# Patient Record
Sex: MALE | Race: WHITE | NOT HISPANIC OR LATINO | URBAN - METROPOLITAN AREA
[De-identification: names, ages, dates, MRNs, and addresses within clinical notes are randomized per-mention and may not be internally consistent; named-entity substitution may affect disease eponyms.]

---

## 2023-07-18 DIAGNOSIS — R05.3 CHRONIC COUGH: ICD-10-CM

## 2023-07-25 NOTE — PROGRESS NOTES
Pulmonary Clinic Consultation          Assessment/Plan:     78 year old male with a history of asthma, HTN, presenting for evaluation of chronic cough.    Moderate persistent asthma  Chronic cough  Environmental allergies  Allergic rhinitis  GERD  Diagnosed as a child, history of allergies to pollen, mold, ragweed, grasses.  Followed by Allergy as recently as 2020, was previously on injections.  Symptoms include chest tightness with exercise, now with worsening cough, sputum production, and rhinorrhea in the last couple months.  His current regimen includes Flovent BID, Albuterol PRN, Zyrtec, Flonase.  Pulmonary function testing today shows normal spirometry, lung volumes, and increased diffusion capacity.  Most likely increase in cough is related to allergies exacerbating his asthma, as well as possible untreated GERD.  Plan:  - increase regimen from ICS to ICS/LABA:  Stop Flovent and start Symbicort (budesonide/formoterol) two puffs twice daily, rinse/gargle after use.  He will let us know if there are coverage issues at pharmacy and if he needs a different script.  - continue Albuterol PRN  - continue Zyrtec   - stop Flonase, start Atrovent nasal spray twice daily  - continue saline rinses daily  - consider addition of Singulair 10mg at next visit if no improvement  - discussed lifestyle changes in regards to GERD:  not laying down after dinner to read, elevating HOB, avoiding triggers (red wine).  Consider PPI at next visit if symptoms not improved.  - he is UTD with COVID vaccines and booster, annual influenza vaccine, and pneumococcal vaccines.  - Action plan: prednisone 20mg x7 days    Follow-up  - one month    Fifi Ospina, CNP  Pulmonary Medicine  [facility]  [phone]       CC:     Chronic cough evaluation     HPI:     78 year old male with a history of asthma, HTN, presenting for evaluation of chronic cough.    April of 2020 had URI, possibly COVID.  Persistent  cough x1 month after.  Very active, played soccer until age 70.  Used to run, now biking the last couple years.  Feels some chest tightness while exercising.  Denies shortness of breath.  Cough is worse recently, last couple months.  Intermittently productive.  Sometimes hard to expectorate.  Denies fevers, muscle aches, night sweats.  On Flovent two puffs BID x couple months, before that was on different ICS.  Has had issues with allergies since moved back to [place] in .  Did allergy testing then, [facility] - mold, ragweed, pollen, grasses.  Was on injections for awhile.   Some increase in sinus symptoms lately - does Neti pot rinses.  Was on Allegra for some time, just switched to Zyrtec, possibly some benefit.  Flonase once-twice/day.  Does have intermittent acid reflux, with red wine. Has seen ENT in past, did scope.  Sometimes does eat within one hour of bedtime.  Smoking hx: quit in .    Medical records reviewed for this visit include PCP note.     ROS:     A 12-system review was obtained and was negative with the exception of the symptoms endorsed in the HPI.       Medical history:       PMH:  Past Medical History:   Diagnosis Date    Asthma     No Comments Provided    Essential hypertension     No Comments Provided    Impotence of organic origin     No Comments Provided    Malignant neoplasm of prostate (H)     2005,PSA 5.67    Peyronie's disease     No Comments Provided    Rosacea     No Comments Provided       PSH:  Past Surgical History:   Procedure Laterality Date    OTHER SURGICAL HISTORY      2005,,RADICAL PROSTATECTOMY,Pathologic stage T2c    VASECTOMY             Allergies:  No Known Allergies    Family Hx:  Family History   Problem Relation Age of Onset    Heart Disease Father         Heart Disease, of AAA    Diabetes Mother         Diabetes    Hypertension Mother         Hypertension    Thyroid Disease Mother         Thyroid Disease       Social  Hx:  Social History     Socioeconomic History    Marital status:      Spouse name: Not on file    Number of children: Not on file    Years of education: Not on file    Highest education level: Not on file   Occupational History    Not on file   Tobacco Use    Smoking status: Former     Packs/day: 1.00     Years: 13.00     Pack years: 13.00     Types: Cigarettes     Quit date: 1974     Years since quittin.6    Smokeless tobacco: Never   Vaping Use    Vaping Use: Never used   Substance and Sexual Activity    Alcohol use: Yes     Alcohol/week: 1.7 standard drinks of alcohol     Comment: Alcoholic Drinks/day: Social    Drug use: Unknown     Types: Other     Comment: Drug use: No    Sexual activity: Yes     Partners: Female   Other Topics Concern    Not on file   Social History Narrative    Preload 2012     Social Determinants of Health     Financial Resource Strain: Not on file   Food Insecurity: Not on file   Transportation Needs: Not on file   Physical Activity: Not on file   Stress: Not on file   Social Connections: Not on file   Intimate Partner Violence: Not on file   Housing Stability: Not on file       Current Meds:  Current Outpatient Medications   Medication Sig Dispense Refill    albuterol (PROAIR HFA/PROVENTIL HFA/VENTOLIN HFA) 108 (90 Base) MCG/ACT inhaler Inhale 2 puffs into the lungs every 6 hours as needed      budesonide-formoterol (SYMBICORT) 160-4.5 MCG/ACT Inhaler Inhale 2 puffs into the lungs 2 times daily 6 g 4    cetirizine (ZYRTEC) 10 MG tablet Take 10 mg by mouth daily      fluticasone (FLONASE) 50 MCG/ACT nasal spray Spray 1 spray into both nostrils daily      fluticasone (FLOVENT HFA) 110 MCG/ACT inhaler Inhale 2 puffs into the lungs 2 times daily      hydroCHLOROthiazide (HYDRODIURIL) 25 MG tablet Take 25 mg by mouth daily      ipratropium (ATROVENT) 0.06 % nasal spray Spray 2 sprays into both nostrils 2 times daily 15 mL 11    losartan (COZAAR) 100 MG tablet Take 100 mg  "by mouth daily      multivitamin w/minerals (THERA-VIT-M) tablet Take 1 tablet by mouth daily      NONFORMULARY Tumeric 1500mg per day  CO Q 10 100mg per day            Physical Exam:     /74 (BP Location: Left arm, Patient Position: Chair, Cuff Size: Adult Regular)   Pulse 62   Ht 1.791 m (5' 10.5\")   Wt 85.7 kg (189 lb)   SpO2 98%   BMI 26.74 kg/m    Gen: adult male , appears in NAD  HEENT: clear conjunctivae, moist mucous membranes  CV: RRR, no M/G/R  Resp: CTAB, no focal crackles or wheezes.  Respirations even and unlabored.  On RA.  Intermittent cough during exam.  Skin: no apparent rashes on visible skin  Ext: no cyanosis, clubbing or edema  Neuro: alert and answering questions appropriately       Data:     Labs:  reviewed    Imaging studies:  I have personally reviewed all pertinent imaging studies and PFT results unless otherwise noted.    EXAM: XR CHEST 2 VIEWS PA AND LATERAL   LOCATION: [facility]   DATE/TIME: 7/2/2020   INDICATION: Pleuritic Chest Pain   COMPARISON: 04/04/2018   IMPRESSION: Heart size and pulmonary vascularity normal. The lungs are clear.   Multiple old right rib fractures.       Pulmonary Function Testing    7/28/23:      2012:        "

## 2023-07-28 ENCOUNTER — ALLIED HEALTH/NURSE VISIT (OUTPATIENT)
Dept: PULMONOLOGY | Facility: CLINIC | Age: 78
End: 2023-07-28
Payer: COMMERCIAL

## 2023-07-28 ENCOUNTER — OFFICE VISIT (OUTPATIENT)
Dept: PULMONOLOGY | Facility: CLINIC | Age: 78
End: 2023-07-28
Payer: COMMERCIAL

## 2023-07-28 VITALS
HEIGHT: 71 IN | SYSTOLIC BLOOD PRESSURE: 132 MMHG | BODY MASS INDEX: 26.46 KG/M2 | WEIGHT: 189 LBS | DIASTOLIC BLOOD PRESSURE: 74 MMHG | OXYGEN SATURATION: 98 % | HEART RATE: 62 BPM

## 2023-07-28 DIAGNOSIS — R05.3 CHRONIC COUGH: ICD-10-CM

## 2023-07-28 DIAGNOSIS — J30.1 NON-SEASONAL ALLERGIC RHINITIS DUE TO POLLEN: ICD-10-CM

## 2023-07-28 DIAGNOSIS — J34.89 RHINORRHEA: ICD-10-CM

## 2023-07-28 DIAGNOSIS — J45.40 MODERATE PERSISTENT ASTHMA WITHOUT COMPLICATION: Primary | ICD-10-CM

## 2023-07-28 LAB
DLCOCOR-%PRED-PRE: 130 %
DLCOCOR-PRE: 32.96 ML/MIN/MMHG
DLCOUNC-%PRED-PRE: 127 %
DLCOUNC-PRE: 32.2 ML/MIN/MMHG
DLCOUNC-PRED: 25.31 ML/MIN/MMHG
ERV-%PRED-PRE: 78 %
ERV-PRE: 0.99 L
ERV-PRED: 1.27 L
EXPTIME-PRE: 6.38 SEC
FEF2575-%PRED-POST: 253 %
FEF2575-%PRED-PRE: 228 %
FEF2575-POST: 5.33 L/SEC
FEF2575-PRE: 4.78 L/SEC
FEF2575-PRED: 2.1 L/SEC
FEFMAX-%PRED-PRE: 128 %
FEFMAX-PRE: 10.07 L/SEC
FEFMAX-PRED: 7.83 L/SEC
FEV1-%PRED-PRE: 121 %
FEV1-PRE: 3.46 L
FEV1FEV6-PRE: 87 %
FEV1FEV6-PRED: 77 %
FEV1FVC-PRE: 87 %
FEV1FVC-PRED: 76 %
FEV1SVC-PRE: 90 %
FEV1SVC-PRED: 66 %
FIFMAX-PRE: 6.13 L/SEC
FRCPLETH-%PRED-PRE: 81 %
FRCPLETH-PRE: 3.3 L
FRCPLETH-PRED: 4.05 L
FVC-%PRED-PRE: 105 %
FVC-PRE: 3.96 L
FVC-PRED: 3.77 L
HGB BLD-MCNC: 13.8 G/DL
IC-%PRED-PRE: 92 %
IC-PRE: 2.85 L
IC-PRED: 3.08 L
RVPLETH-%PRED-PRE: 84 %
RVPLETH-PRE: 2.31 L
RVPLETH-PRED: 2.73 L
TLCPLETH-%PRED-PRE: 85 %
TLCPLETH-PRE: 6.15 L
TLCPLETH-PRED: 7.22 L
VA-%PRED-PRE: 95 %
VA-PRE: 6.19 L
VC-%PRED-PRE: 88 %
VC-PRE: 3.84 L
VC-PRED: 4.32 L

## 2023-07-28 PROCEDURE — 85018 HEMOGLOBIN: CPT | Performed by: INTERNAL MEDICINE

## 2023-07-28 PROCEDURE — 99204 OFFICE O/P NEW MOD 45 MIN: CPT | Performed by: NURSE PRACTITIONER

## 2023-07-28 PROCEDURE — 94726 PLETHYSMOGRAPHY LUNG VOLUMES: CPT | Performed by: INTERNAL MEDICINE

## 2023-07-28 PROCEDURE — 94060 EVALUATION OF WHEEZING: CPT | Performed by: INTERNAL MEDICINE

## 2023-07-28 PROCEDURE — 94729 DIFFUSING CAPACITY: CPT | Performed by: INTERNAL MEDICINE

## 2023-07-28 RX ORDER — LOSARTAN POTASSIUM 100 MG/1
100 TABLET ORAL DAILY
COMMUNITY

## 2023-07-28 RX ORDER — FLUTICASONE PROPIONATE 50 MCG
1 SPRAY, SUSPENSION (ML) NASAL DAILY
COMMUNITY
End: 2023-09-12 | Stop reason: ALTCHOICE

## 2023-07-28 RX ORDER — ALBUTEROL SULFATE 90 UG/1
2 AEROSOL, METERED RESPIRATORY (INHALATION) EVERY 6 HOURS PRN
COMMUNITY

## 2023-07-28 RX ORDER — BUDESONIDE AND FORMOTEROL FUMARATE DIHYDRATE 160; 4.5 UG/1; UG/1
2 AEROSOL RESPIRATORY (INHALATION) 2 TIMES DAILY
Qty: 6 G | Refills: 4 | Status: SHIPPED | OUTPATIENT
Start: 2023-07-28 | End: 2023-10-27

## 2023-07-28 RX ORDER — MULTIPLE VITAMINS W/ MINERALS TAB 9MG-400MCG
1 TAB ORAL DAILY
COMMUNITY

## 2023-07-28 RX ORDER — FLUTICASONE PROPIONATE 110 UG/1
2 AEROSOL, METERED RESPIRATORY (INHALATION) 2 TIMES DAILY
COMMUNITY
End: 2024-06-10 | Stop reason: ALTCHOICE

## 2023-07-28 RX ORDER — IPRATROPIUM BROMIDE 42 UG/1
2 SPRAY, METERED NASAL 2 TIMES DAILY
Qty: 15 ML | Refills: 11 | Status: SHIPPED | OUTPATIENT
Start: 2023-07-28 | End: 2024-06-10 | Stop reason: ALTCHOICE

## 2023-07-28 RX ORDER — CETIRIZINE HYDROCHLORIDE 10 MG/1
10 TABLET ORAL DAILY
COMMUNITY

## 2023-07-28 NOTE — PATIENT INSTRUCTIONS
It was a pleasure to see you in clinic today.   Here is what we discussed:    Stop Flovent, start Symbicort two puffs twice daily, rinse/gargle.  Continue Albuterol every 4-6 hours for shortness of breath or wheezing.  Continue Zyrtec 10mg daily.  Start Atrovent nasal spray twice daily.  Stay upright at least one hour after dinner.   Call us with any change or worsening of your breathing.  Follow-up in one month.    Fifi Ospina, CNP  Pulmonary Medicine  [facility]  [phone]

## 2023-09-12 ENCOUNTER — OFFICE VISIT (OUTPATIENT)
Dept: PULMONOLOGY | Facility: CLINIC | Age: 78
End: 2023-09-12
Attending: NURSE PRACTITIONER
Payer: COMMERCIAL

## 2023-09-12 VITALS
HEART RATE: 66 BPM | BODY MASS INDEX: 27.39 KG/M2 | SYSTOLIC BLOOD PRESSURE: 128 MMHG | WEIGHT: 193.6 LBS | OXYGEN SATURATION: 98 % | DIASTOLIC BLOOD PRESSURE: 70 MMHG

## 2023-09-12 DIAGNOSIS — J34.89 RHINORRHEA: ICD-10-CM

## 2023-09-12 DIAGNOSIS — R05.3 CHRONIC COUGH: Primary | ICD-10-CM

## 2023-09-12 DIAGNOSIS — K21.9 GASTROESOPHAGEAL REFLUX DISEASE WITHOUT ESOPHAGITIS: ICD-10-CM

## 2023-09-12 DIAGNOSIS — J45.40 MODERATE PERSISTENT ASTHMA WITHOUT COMPLICATION: ICD-10-CM

## 2023-09-12 PROCEDURE — 99213 OFFICE O/P EST LOW 20 MIN: CPT | Performed by: NURSE PRACTITIONER

## 2023-09-12 ASSESSMENT — ASTHMA QUESTIONNAIRES
QUESTION_4 LAST FOUR WEEKS HOW OFTEN HAVE YOU USED YOUR RESCUE INHALER OR NEBULIZER MEDICATION (SUCH AS ALBUTEROL): NOT AT ALL
ACT_TOTALSCORE: 25
QUESTION_5 LAST FOUR WEEKS HOW WOULD YOU RATE YOUR ASTHMA CONTROL: COMPLETELY CONTROLLED
QUESTION_3 LAST FOUR WEEKS HOW OFTEN DID YOUR ASTHMA SYMPTOMS (WHEEZING, COUGHING, SHORTNESS OF BREATH, CHEST TIGHTNESS OR PAIN) WAKE YOU UP AT NIGHT OR EARLIER THAN USUAL IN THE MORNING: NOT AT ALL
QUESTION_2 LAST FOUR WEEKS HOW OFTEN HAVE YOU HAD SHORTNESS OF BREATH: NOT AT ALL
QUESTION_1 LAST FOUR WEEKS HOW MUCH OF THE TIME DID YOUR ASTHMA KEEP YOU FROM GETTING AS MUCH DONE AT WORK, SCHOOL OR AT HOME: NONE OF THE TIME
ACT_TOTALSCORE: 25

## 2023-09-12 NOTE — PROGRESS NOTES
Pulmonary Clinic Follow-Up          Assessment/Plan:     78 year old male with a history of asthma, HTN, presenting for follow-up of chronic cough.    Moderate persistent asthma  Chronic cough  Environmental allergies  Allergic rhinitis  GERD  Diagnosed with asthma as a child, history of allergies to pollen, mold, ragweed, grasses.  Followed by Allergy as recently as 2020, was previously on injections.  Symptoms include chest tightness with exercise, now with worsening cough, sputum production, and rhinorrhea in the last couple months.  Pulmonary function testing shows normal spirometry, lung volumes, and increased diffusion capacity, which can be seen in asthma.  Last visit it was suspected that his increase in cough was related to allergies exacerbating his asthma, as well as possible untreated GERD.  His regimen was increased from Flovent to Symbicort.  He found benefit from this, and also with addressing his acid reflux with lifestyle changes.  Plan:  - continue Symbicort (budesonide/formoterol) 160/4.5 two puffs twice daily, rinse/gargle after use.   - continue Albuterol PRN  - continue Zyrtec 10mg  - continue Atrovent nasal spray, can adjust this based on how drying it is.  - continue saline rinses daily, prior to nasal spray  - discussed lifestyle changes in regards to GERD:  not laying down after dinner to read, elevating HOB, avoiding triggers (red wine, tomatoes).  He will trial something OTC as well, he is hesitant to start PPI at this time.  - he is UTD with COVID vaccines and booster, annual influenza vaccine, and pneumococcal vaccines.  - Action plan: prednisone 20mg x7 days    Follow-up  - nine months    Fifi Ospina, CNP  Pulmonary Medicine  [facility]  [phone]       CC:     Chronic cough follow-up     HPI:     78 year old male with a history of asthma, HTN, presenting for follow-up of chronic cough.    Since last visit, he did find benefit from Symbicort,  as well as Atrovent nasal spray.  He does notice that tomatoes has become one of his triggers for acid reflux, and he noticed increased cough after a meal with tomato sauce.   He is still able to bike, has not trialed running again yet.    Previous HPI:  April of 2020 had URI, possibly COVID.  Persistent cough x1 month after.  Very active, played soccer until age 70.  Used to run, now biking the last couple years.  Feels some chest tightness while exercising.  Denies shortness of breath.  Cough is worse recently, last couple months.  Intermittently productive.  Sometimes hard to expectorate.  Denies fevers, muscle aches, night sweats.  On Flovent two puffs BID x couple months, before that was on different ICS.  Has had issues with allergies since moved back to [place] in 1988.  Did allergy testing then, [facility] - mold, ragweed, pollen, grasses.  Was on injections for awhile.   Some increase in sinus symptoms lately - does Neti pot rinses.  Was on Allegra for some time, just switched to Zyrtec, possibly some benefit.  Flonase once-twice/day.  Does have intermittent acid reflux, with red wine. Has seen ENT in past, did scope.  Sometimes does eat within one hour of bedtime.  Smoking hx: quit in 1974.       ROS:     A 6-system review was obtained and was negative with the exception of the symptoms endorsed in the HPI.       Medical history:       PMH:  Past Medical History:   Diagnosis Date    Asthma     No Comments Provided    Essential hypertension     No Comments Provided    Impotence of organic origin     No Comments Provided    Malignant neoplasm of prostate (H)     1/5/2005,PSA 5.67    Peyronie's disease     No Comments Provided    Rosacea     No Comments Provided       PSH:  Past Surgical History:   Procedure Laterality Date    OTHER SURGICAL HISTORY      2/23/2005,,RADICAL PROSTATECTOMY,Pathologic stage T2c    VASECTOMY      1992       Allergies:  Allergies   Allergen Reactions    Mold         Family Hx:  Family History   Problem Relation Age of Onset    Heart Disease Father         Heart Disease, of AAA    Diabetes Mother         Diabetes    Hypertension Mother         Hypertension    Thyroid Disease Mother         Thyroid Disease       Social Hx:  Social History     Socioeconomic History    Marital status:      Spouse name: Not on file    Number of children: Not on file    Years of education: Not on file    Highest education level: Not on file   Occupational History    Not on file   Tobacco Use    Smoking status: Former     Packs/day: 1.00     Years: 13.00     Pack years: 13.00     Types: Cigarettes     Quit date: 1974     Years since quittin.7    Smokeless tobacco: Never   Vaping Use    Vaping Use: Never used   Substance and Sexual Activity    Alcohol use: Yes     Alcohol/week: 1.7 standard drinks of alcohol     Comment: Alcoholic Drinks/day: Social    Drug use: Unknown     Types: Other     Comment: Drug use: No    Sexual activity: Yes     Partners: Female   Other Topics Concern    Not on file   Social History Narrative    Preload 2012     Social Determinants of Health     Financial Resource Strain: Not on file   Food Insecurity: Not on file   Transportation Needs: Not on file   Physical Activity: Not on file   Stress: Not on file   Social Connections: Not on file   Intimate Partner Violence: Not on file   Housing Stability: Not on file       Current Meds:  Current Outpatient Medications   Medication Sig Dispense Refill    albuterol (PROAIR HFA/PROVENTIL HFA/VENTOLIN HFA) 108 (90 Base) MCG/ACT inhaler Inhale 2 puffs into the lungs every 6 hours as needed      budesonide-formoterol (SYMBICORT) 160-4.5 MCG/ACT Inhaler Inhale 2 puffs into the lungs 2 times daily 6 g 4    cetirizine (ZYRTEC) 10 MG tablet Take 10 mg by mouth daily      fluticasone (FLOVENT HFA) 110 MCG/ACT inhaler Inhale 2 puffs into the lungs 2 times daily      hydroCHLOROthiazide (HYDRODIURIL) 25 MG tablet  Take 25 mg by mouth daily      losartan (COZAAR) 100 MG tablet Take 100 mg by mouth daily      multivitamin w/minerals (THERA-VIT-M) tablet Take 1 tablet by mouth daily      NONFORMULARY Tumeric 1500mg per day  CO Q 10 100mg per day      fluticasone (FLONASE) 50 MCG/ACT nasal spray Spray 1 spray into both nostrils daily      ipratropium (ATROVENT) 0.06 % nasal spray Spray 2 sprays into both nostrils 2 times daily 15 mL 11          Physical Exam:     /70 (BP Location: Left arm, Patient Position: Sitting, Cuff Size: Adult Regular)   Pulse 66   Wt 87.8 kg (193 lb 9.6 oz)   SpO2 98%   BMI 27.39 kg/m    Gen: adult male , appears in NAD  HEENT: clear conjunctivae, moist mucous membranes  CV: RRR, no M/G/R  Resp: CTAB, no focal crackles or wheezes.  Respirations even and unlabored.  On RA.  Intermittent cough during exam.  Skin: no apparent rashes on visible skin  Ext: no cyanosis, clubbing or edema  Neuro: alert and answering questions appropriately       Data:     Labs:  reviewed    Imaging studies:  I have personally reviewed all pertinent imaging studies and PFT results unless otherwise noted.    EXAM: XR CHEST 2 VIEWS PA AND LATERAL   LOCATION: [facility]   DATE/TIME: 7/2/2020   INDICATION: Pleuritic Chest Pain   COMPARISON: 04/04/2018   IMPRESSION: Heart size and pulmonary vascularity normal. The lungs are clear.   Multiple old right rib fractures.       Pulmonary Function Testing    7/28/23:      2012:

## 2023-09-12 NOTE — PATIENT INSTRUCTIONS
It was a pleasure to see you in clinic today.   Here is what we discussed:    Continue Symbicort two puffs twice daily, rinse/gargle after use.  Continue Albuterol every 4-6 hours as needed for shortness of breath or wheezing.  Trial an over the counter medication for acid reflux, and watch out for potential food triggers.    Continue Zyrtec 10mg daily  Continue Atrovent nasal spray and saline rinses  Call us with any change or worsening of your breathing.  Follow-up in 6 months.    Fifi Ospina, CNP  Pulmonary Medicine  [facility]  [phone]

## 2023-10-27 ENCOUNTER — TELEPHONE (OUTPATIENT)
Dept: PULMONOLOGY | Facility: CLINIC | Age: 78
End: 2023-10-27
Payer: COMMERCIAL

## 2023-10-27 DIAGNOSIS — J45.40 MODERATE PERSISTENT ASTHMA WITHOUT COMPLICATION: ICD-10-CM

## 2023-10-27 RX ORDER — BUDESONIDE AND FORMOTEROL FUMARATE DIHYDRATE 160; 4.5 UG/1; UG/1
2 AEROSOL RESPIRATORY (INHALATION) 2 TIMES DAILY
Qty: 10.2 G | Refills: 6 | Status: SHIPPED | OUTPATIENT
Start: 2023-10-27 | End: 2024-05-29

## 2023-10-27 NOTE — TELEPHONE ENCOUNTER
[facility] Call Center    Phone Message    May a detailed message be left on voicemail: yes     Reason for Call: Medication Refill Request    Has the patient contacted the pharmacy for the refill? Yes   Name of medication being requested: budesonide-formoterol (SYMBICORT) 160-4.5 MCG/ACT Inhaler   Provider who prescribed the medication: Fifi Gonzalez  Pharmacy: [facility] in [place], [place]   Date medication is needed: asap       Action Taken: Other: pulm    Travel Screening: Not Applicable

## 2024-05-29 DIAGNOSIS — J45.40 MODERATE PERSISTENT ASTHMA WITHOUT COMPLICATION: ICD-10-CM

## 2024-05-29 RX ORDER — BUDESONIDE AND FORMOTEROL FUMARATE DIHYDRATE 160; 4.5 UG/1; UG/1
2 AEROSOL RESPIRATORY (INHALATION) 2 TIMES DAILY
Qty: 10.2 G | Refills: 1 | Status: SHIPPED | OUTPATIENT
Start: 2024-05-29 | End: 2024-05-31

## 2024-05-31 DIAGNOSIS — J45.40 MODERATE PERSISTENT ASTHMA WITHOUT COMPLICATION: ICD-10-CM

## 2024-05-31 RX ORDER — BUDESONIDE AND FORMOTEROL FUMARATE DIHYDRATE 160; 4.5 UG/1; UG/1
2 AEROSOL RESPIRATORY (INHALATION) 2 TIMES DAILY
Qty: 30.6 G | Refills: 0 | Status: SHIPPED | OUTPATIENT
Start: 2024-05-31 | End: 2024-06-10

## 2024-06-10 ENCOUNTER — OFFICE VISIT (OUTPATIENT)
Dept: PULMONOLOGY | Facility: CLINIC | Age: 79
End: 2024-06-10
Attending: NURSE PRACTITIONER
Payer: COMMERCIAL

## 2024-06-10 VITALS
HEART RATE: 60 BPM | DIASTOLIC BLOOD PRESSURE: 76 MMHG | SYSTOLIC BLOOD PRESSURE: 138 MMHG | WEIGHT: 194 LBS | OXYGEN SATURATION: 100 % | BODY MASS INDEX: 27.44 KG/M2

## 2024-06-10 DIAGNOSIS — J30.1 NON-SEASONAL ALLERGIC RHINITIS DUE TO POLLEN: ICD-10-CM

## 2024-06-10 DIAGNOSIS — J45.40 MODERATE PERSISTENT ASTHMA WITHOUT COMPLICATION: Primary | ICD-10-CM

## 2024-06-10 DIAGNOSIS — K21.9 GASTROESOPHAGEAL REFLUX DISEASE WITHOUT ESOPHAGITIS: ICD-10-CM

## 2024-06-10 PROCEDURE — 99214 OFFICE O/P EST MOD 30 MIN: CPT | Performed by: NURSE PRACTITIONER

## 2024-06-10 RX ORDER — AMLODIPINE BESYLATE 5 MG/1
5 TABLET ORAL DAILY
COMMUNITY

## 2024-06-10 RX ORDER — BUDESONIDE AND FORMOTEROL FUMARATE DIHYDRATE 160; 4.5 UG/1; UG/1
2 AEROSOL RESPIRATORY (INHALATION) 2 TIMES DAILY
Qty: 30.6 G | Refills: 4 | Status: SHIPPED | OUTPATIENT
Start: 2024-06-10

## 2024-06-10 ASSESSMENT — ASTHMA QUESTIONNAIRES
ACT_TOTALSCORE: 25
QUESTION_5 LAST FOUR WEEKS HOW WOULD YOU RATE YOUR ASTHMA CONTROL: COMPLETELY CONTROLLED
QUESTION_3 LAST FOUR WEEKS HOW OFTEN DID YOUR ASTHMA SYMPTOMS (WHEEZING, COUGHING, SHORTNESS OF BREATH, CHEST TIGHTNESS OR PAIN) WAKE YOU UP AT NIGHT OR EARLIER THAN USUAL IN THE MORNING: NOT AT ALL
QUESTION_4 LAST FOUR WEEKS HOW OFTEN HAVE YOU USED YOUR RESCUE INHALER OR NEBULIZER MEDICATION (SUCH AS ALBUTEROL): NOT AT ALL
QUESTION_2 LAST FOUR WEEKS HOW OFTEN HAVE YOU HAD SHORTNESS OF BREATH: NOT AT ALL
ACT_TOTALSCORE: 25
QUESTION_1 LAST FOUR WEEKS HOW MUCH OF THE TIME DID YOUR ASTHMA KEEP YOU FROM GETTING AS MUCH DONE AT WORK, SCHOOL OR AT HOME: NONE OF THE TIME

## 2024-06-10 NOTE — PROGRESS NOTES
Pulmonary Clinic Follow-Up          Assessment/Plan:     78 year old male with a history of asthma, HTN, presenting for 9 month follow-up of chronic cough.    Moderate persistent asthma  Chronic cough  Environmental allergies  Allergic rhinitis  GERD  Diagnosed with asthma as a child, history of allergies to pollen, mold, ragweed, grasses.  Followed by Allergy as recently as 2020, was previously on injections.  Symptoms include chest tightness with exercise, cough, sputum production, and rhinorrhea.  Pulmonary function testing shows normal spirometry, lung volumes, and increased diffusion capacity, which can be seen in asthma.  Last visit it was suspected that his increase in cough was related to allergies exacerbating his asthma, as well as possible untreated GERD.  His asthma has been controlled with Symbicort.  ACT score 25 today.  Continues to have dry cough, did see GI and was recommended to start omeprazole 40mg daily, but he has only been taking this in two week periods with breaks in between.    Plan:  - continue Symbicort (budesonide/formoterol) 160/4.5 two puffs twice daily, rinse/gargle after use.   - continue Albuterol PRN  - continue Zyrtec 10mg, switches between Allegra.  - continue Azelastine + Flonase nasal sprays, per ENT.  - continue saline rinses daily, prior to nasal spray  - restart omeprazole as directed by GI, and follow-up as indicated.  - continue lifestyle changes in regards to GERD:  not laying down after dinner to read, elevating HOB, avoiding triggers (red wine, tomatoes).    - he is UTD with COVID vaccines and booster, annual influenza vaccine, and pneumococcal vaccines.  - Action plan: prednisone 20mg x7 days    Follow-up  - one year    Fifi Ospina CNP  Pulmonary Medicine  [facility]  [phone]         CC:     Chronic cough follow-up     HPI:     78 year old male with a history of asthma, HTN, presenting for 9 month follow-up of chronic  cough.    Since last visit (2023), breathing has been stable and controlled.  No URIs or exacerbations since last visit.   Now using azelastine nasal spray + flonase.  Working better than atrovent. Through ENT.   Was on omeprazole, taking on empty stomach, has to set an alarm.   Doesn't have typical acid reflux symptoms. Taking omeprazole only for 2 weeks at a time, then months break.   Saw GI few months ago at [facility].  Recommended to take 40mg of omeprazole, only took for 2 weeks.   Saw allergist for testing in past.           2023     9:00 AM 6/10/2024     9:44 AM   ACT Total Scores   ACT TOTAL SCORE (Goal Greater than or Equal to 20) 25 25   In the past 12 months, how many times did you visit the emergency room for your asthma without being admitted to the hospital? 0 0   In the past 12 months, how many times were you hospitalized overnight because of your asthma? 0 0          ROS:     A 6-system review was obtained and was negative with the exception of the symptoms endorsed in the HPI.       Medical history:       PMH:  Past Medical History:   Diagnosis Date    Asthma     No Comments Provided    Essential hypertension     No Comments Provided    Impotence of organic origin     No Comments Provided    Malignant neoplasm of prostate (H)     2005,PSA 5.67    Peyronie's disease     No Comments Provided    Rosacea     No Comments Provided       PSH:  Past Surgical History:   Procedure Laterality Date    OTHER SURGICAL HISTORY      2005,,RADICAL PROSTATECTOMY,Pathologic stage T2c    VASECTOMY             Allergies:  Allergies   Allergen Reactions    Mold        Family Hx:  Family History   Problem Relation Age of Onset    Heart Disease Father         Heart Disease, of AAA    Diabetes Mother         Diabetes    Hypertension Mother         Hypertension    Thyroid Disease Mother         Thyroid Disease       Social Hx:  Social History     Socioeconomic History    Marital status:       Spouse name: Not on file    Number of children: Not on file    Years of education: Not on file    Highest education level: Not on file   Occupational History    Not on file   Tobacco Use    Smoking status: Former     Current packs/day: 0.00     Average packs/day: 1 pack/day for 13.0 years (13.0 ttl pk-yrs)     Types: Cigarettes     Start date: 1961     Quit date: 1974     Years since quittin.4    Smokeless tobacco: Never   Vaping Use    Vaping status: Never Used   Substance and Sexual Activity    Alcohol use: Yes     Alcohol/week: 1.7 standard drinks of alcohol     Comment: Alcoholic Drinks/day: Social    Drug use: Unknown     Types: Other     Comment: Drug use: No    Sexual activity: Yes     Partners: Female   Other Topics Concern    Not on file   Social History Narrative    Preload 2012     Social Determinants of Health     Financial Resource Strain: Low Risk  (2023)    Received from [facility] [employer] [facility] [employer] [facility], [facility]    Financial Resource Strain     Difficulty of Paying Living Expenses: 3     Difficulty of Paying Living Expenses: Not on file   Food Insecurity: No Food Insecurity (2023)    Received from [facility] [employer] [facility], [facility] [place] [employer] [facility]    Food Insecurity     Worried About Running Out of Food in the Last Year: 1   Transportation Needs: No Transportation Needs (2023)    [facility], [facility]    Transportation Needs     Lack of Transportation (Medical): 1   Physical Activity: Not on file   Stress: Not on file   Social Connections: Unknown (2024)    Received from [facility] [employer] [facility]    Social Connections     Frequency of Communication with Friends and Family: Not on file   Interpersonal Safety: Not on file   Housing Stability: Low Risk  (2023)    Received  from [facility], [facility]    Housing Stability     Unable to Pay for Housing in the Last Year: 1       Current Meds:  Current Outpatient Medications   Medication Sig Dispense Refill    albuterol (PROAIR HFA/PROVENTIL HFA/VENTOLIN HFA) 108 (90 Base) MCG/ACT inhaler Inhale 2 puffs into the lungs every 6 hours as needed      amLODIPine (NORVASC) 5 MG tablet Take 5 mg by mouth daily      budesonide-formoterol (SYMBICORT) 160-4.5 MCG/ACT Inhaler Inhale 2 puffs into the lungs 2 times daily 30.6 g 0    cetirizine (ZYRTEC) 10 MG tablet Take 10 mg by mouth daily      hydroCHLOROthiazide (HYDRODIURIL) 25 MG tablet Take 25 mg by mouth daily      losartan (COZAAR) 100 MG tablet Take 100 mg by mouth daily      multivitamin w/minerals (THERA-VIT-M) tablet Take 1 tablet by mouth daily      NONFORMULARY Tumeric 1500mg per day  CO Q 10 100mg per day      omeprazole (PRILOSEC) 20 MG DR capsule Take 20 mg by mouth daily            Physical Exam:     /76 (BP Location: Left arm, Patient Position: Chair, Cuff Size: Adult Large)   Pulse 60   Wt 88 kg (194 lb)   SpO2 100%   BMI 27.44 kg/m    Gen: adult male, appears in NAD  HEENT: clear conjunctivae, moist mucous membranes  CV: RRR, no M/G/R  Resp: CTAB, no focal crackles or wheezes.  Respirations even and unlabored.  On RA.    Skin: no apparent rashes on visible skin  Ext: no cyanosis, clubbing or edema  Neuro: alert and answering questions appropriately       Data:     Labs:  reviewed    Imaging studies:  I have personally reviewed all pertinent imaging studies and PFT results unless otherwise noted.    EXAM: XR CHEST 2 VIEWS PA AND LATERAL   LOCATION: [facility]   DATE/TIME: 7/2/2020   INDICATION: Pleuritic Chest Pain   COMPARISON: 04/04/2018   IMPRESSION: Heart size and pulmonary vascularity normal. The lungs are clear.   Multiple old right rib fractures.       Pulmonary Function  Testing    7/28/23:      2012:

## 2024-06-10 NOTE — PATIENT INSTRUCTIONS
It was a pleasure to see you in clinic today.   Here is what we discussed:    Continue Symbicort two puffs twice daily, rinse/gargle after use.   Continue Albuterol every 4-6 hours as needed for shortness of breath or wheezing.  Continue Zyrtec 10mg daily.  Restart omeprazole 40mg daily, and follow-up with GI.  Continue Azelastine + Flonase nasal sprays, through ENT.   Call my nurse, Minh ([phone]) with any change or worsening of your breathing.  Follow-up in one year.     Fifi Ospina CNP  Pulmonary Medicine  [facility]  [phone]